# Patient Record
Sex: MALE | Race: OTHER | Employment: FULL TIME | URBAN - METROPOLITAN AREA
[De-identification: names, ages, dates, MRNs, and addresses within clinical notes are randomized per-mention and may not be internally consistent; named-entity substitution may affect disease eponyms.]

---

## 2017-03-28 ENCOUNTER — HOSPITAL ENCOUNTER (EMERGENCY)
Facility: HOSPITAL | Age: 32
Discharge: HOME | End: 2017-03-29
Attending: EMERGENCY MEDICINE
Payer: COMMERCIAL

## 2017-03-28 DIAGNOSIS — J45.901 ASTHMA EXACERBATION: Primary | ICD-10-CM

## 2017-03-28 PROCEDURE — 96365 THER/PROPH/DIAG IV INF INIT: CPT

## 2017-03-28 PROCEDURE — 94644 CONT INHLJ TX 1ST HOUR: CPT

## 2017-03-28 PROCEDURE — 99285 EMERGENCY DEPT VISIT HI MDM: CPT

## 2017-03-28 RX ORDER — IPRATROPIUM BROMIDE AND ALBUTEROL SULFATE 2.5; .5 MG/3ML; MG/3ML
3 SOLUTION RESPIRATORY (INHALATION) ONCE
Status: DISCONTINUED | OUTPATIENT
Start: 2017-03-28 | End: 2017-03-28

## 2017-03-28 RX ORDER — PREDNISONE 20 MG/1
60 TABLET ORAL ONCE
Status: COMPLETED | OUTPATIENT
Start: 2017-03-28 | End: 2017-03-29

## 2017-03-28 RX ORDER — FLUTICASONE PROPIONATE AND SALMETEROL 500; 50 UG/1; UG/1
1 POWDER RESPIRATORY (INHALATION) 2 TIMES DAILY
COMMUNITY

## 2017-03-28 RX ORDER — ALBUTEROL SULFATE 90 UG/1
AEROSOL, METERED RESPIRATORY (INHALATION) EVERY 6 HOURS PRN
COMMUNITY

## 2017-03-29 VITALS
RESPIRATION RATE: 16 BRPM | HEART RATE: 97 BPM | TEMPERATURE: 98 F | WEIGHT: 200 LBS | BODY MASS INDEX: 31.39 KG/M2 | SYSTOLIC BLOOD PRESSURE: 118 MMHG | HEIGHT: 67 IN | OXYGEN SATURATION: 97 % | DIASTOLIC BLOOD PRESSURE: 66 MMHG

## 2017-03-29 RX ORDER — PREDNISONE 20 MG/1
40 TABLET ORAL DAILY
Qty: 8 TABLET | Refills: 0 | Status: SHIPPED | OUTPATIENT
Start: 2017-03-29 | End: 2017-04-02

## 2017-03-29 RX ORDER — ALBUTEROL SULFATE 90 UG/1
2 AEROSOL, METERED RESPIRATORY (INHALATION) EVERY 4 HOURS PRN
Qty: 1 INHALER | Refills: 1 | Status: SHIPPED | OUTPATIENT
Start: 2017-03-29 | End: 2017-04-28

## 2017-03-29 NOTE — ED NOTES
Patient states he is comfortable going home with prednisone and albuterol as discussed and ordered by Dr. Carol Ann Fernando. IV discontinued. Vitals stable. AVS reviewed. Patient verbalizes understanding of dc instructions. Denies any further questions/concerns.  D

## 2017-03-29 NOTE — ED NOTES
Breathing treatment continued at this time. Patient states improvement in breathing. Contacted pharmacy regarding IVPB medication they are sending now. Patient O2 100% at this time.

## 2017-03-29 NOTE — ED NOTES
IV medication complete. Patient RA saturations 97%. Patient continues to state that breathing is improved. Expiratory wheezes still noted bilaterally upon auscultation but significantly impoved in comparison to prior lung sounds.  Dr. Valeri Real at bedside a

## 2017-03-29 NOTE — ED NOTES
Magnesium Sulfate infusing at this time. Prednisone administered as ordered. Breathing treatment continued. Inspiratory wheezes improved- Expiratory wheezes remain bilaterally upon auscultation. Patient states he is feeling better.  Will continue to monitor

## 2017-03-29 NOTE — ED NOTES
Patient to ED for SOB after working out. Hx of Asthma- recently he ran out of his Advair inhaler.  He states that sometimes he feels this way after working out and it is typically resolved w/ the use of his inhaler but since it ran out he could not find rel

## 2017-04-01 NOTE — ED PROVIDER NOTES
Patient Seen in: [facility] AND [facility] Emergency Department    History   Patient presents with:  Dyspnea CRYSTAL SOB (respiratory)    Stated Complaint: Shortness of breath. Asthma exacerbation.     HPI    42-year-old male with history of asthma presents for eval 170.2 cm (5' 7\")  Wt 90.719 kg  BMI 31.32 kg/m2  SpO2 97%        Physical Exam   Constitutional: He is oriented to person, place, and time. He appears well-developed and well-nourished. He appears distressed. HENT:   Head: Normocephalic and atraumatic. (primary encounter diagnosis)    Disposition:  Discharge    Follow-up:  Carlos Norris MD  [address]  [address] [phone]            Medications Prescribed:  Discharge Medication List as of 3/29/20

## 2020-07-28 ENCOUNTER — HOSPITAL ENCOUNTER (EMERGENCY)
Facility: HOSPITAL | Age: 35
Discharge: HOME | End: 2020-07-28
Attending: EMERGENCY MEDICINE
Payer: COMMERCIAL

## 2020-07-28 VITALS
HEART RATE: 82 BPM | DIASTOLIC BLOOD PRESSURE: 82 MMHG | TEMPERATURE: 99 F | RESPIRATION RATE: 16 BRPM | HEIGHT: 66 IN | WEIGHT: 230 LBS | OXYGEN SATURATION: 98 % | SYSTOLIC BLOOD PRESSURE: 141 MMHG | BODY MASS INDEX: 36.96 KG/M2

## 2020-07-28 DIAGNOSIS — L30.9 DERMATITIS: Primary | ICD-10-CM

## 2020-07-28 PROCEDURE — 99283 EMERGENCY DEPT VISIT LOW MDM: CPT

## 2020-07-28 RX ORDER — PREDNISONE 20 MG/1
40 TABLET ORAL DAILY
Qty: 10 TABLET | Refills: 0 | Status: SHIPPED | OUTPATIENT
Start: 2020-07-28 | End: 2020-08-02

## 2020-07-28 NOTE — ED PROVIDER NOTES
Patient Seen in: [facility] AND [facility] Emergency Department    History   Patient presents with:  Rash Skin Problem    Stated Complaint: rash x3days    HPI    Patient is here with complaint of rash. He has had it for the past 2 to 3 days.   He was out by a po lesions. Skin:  Warm, dry, well perfused. Good skin turgor. Patient has rash noted more on the upper torso upper arms scattered on the back and a few small areas on the inner upper thighs bilaterally. No intraoral lesions noted.   There are areas of f

## 2021-04-04 ENCOUNTER — APPOINTMENT (OUTPATIENT)
Dept: GENERAL RADIOLOGY | Facility: HOSPITAL | Age: 36
End: 2021-04-04
Payer: COMMERCIAL

## 2021-04-04 ENCOUNTER — HOSPITAL ENCOUNTER (EMERGENCY)
Facility: HOSPITAL | Age: 36
Discharge: HOME | End: 2021-04-05
Payer: COMMERCIAL

## 2021-04-04 VITALS
RESPIRATION RATE: 18 BRPM | SYSTOLIC BLOOD PRESSURE: 138 MMHG | DIASTOLIC BLOOD PRESSURE: 65 MMHG | HEART RATE: 93 BPM | OXYGEN SATURATION: 95 % | TEMPERATURE: 98 F

## 2021-04-04 DIAGNOSIS — S67.197A CRUSHING INJURY OF LEFT LITTLE FINGER, INITIAL ENCOUNTER: Primary | ICD-10-CM

## 2021-04-04 DIAGNOSIS — S61.317A LACERATION OF LEFT LITTLE FINGER WITHOUT FOREIGN BODY WITH DAMAGE TO NAIL, INITIAL ENCOUNTER: ICD-10-CM

## 2021-04-04 DIAGNOSIS — S62.637B OPEN DISPLACED FRACTURE OF DISTAL PHALANX OF LEFT LITTLE FINGER, INITIAL ENCOUNTER: ICD-10-CM

## 2021-04-04 PROCEDURE — 96372 THER/PROPH/DIAG INJ SC/IM: CPT

## 2021-04-04 PROCEDURE — 99284 EMERGENCY DEPT VISIT MOD MDM: CPT

## 2021-04-04 PROCEDURE — 29130 APPL FINGER SPLINT STATIC: CPT

## 2021-04-04 PROCEDURE — 73140 X-RAY EXAM OF FINGER(S): CPT

## 2021-04-04 PROCEDURE — A4216 STERILE WATER/SALINE, 10 ML: HCPCS | Performed by: NURSE PRACTITIONER

## 2021-04-04 RX ORDER — HYDROCODONE BITARTRATE AND ACETAMINOPHEN 5; 325 MG/1; MG/1
1 TABLET ORAL EVERY 6 HOURS PRN
Qty: 12 TABLET | Refills: 0 | Status: SHIPPED | OUTPATIENT
Start: 2021-04-04 | End: 2021-04-07

## 2021-04-04 RX ORDER — ACETAMINOPHEN 500 MG
1000 TABLET ORAL ONCE
Status: COMPLETED | OUTPATIENT
Start: 2021-04-04 | End: 2021-04-04

## 2021-04-05 NOTE — ED QUICK NOTES
Pt's partner sts she cleaned injury w/ peroxide, which was wrapped in gauze/medical tape upon arrival. Adam Roberts by RN @ this time, bleeding controlled. PCT to irrigate. Pt sts tetanus within last 10 yrs.

## 2021-04-05 NOTE — ED PROVIDER NOTES
Patient Seen in: [facility] AND [facility] Emergency Department      History   Patient presents with:  Laceration/Abrasion    Stated Complaint: finger laceration, caught in garage.     HPI/Subjective:   HPI    77-year-old male presents to the emergency department Extraocular movements intact. Conjunctiva/sclera: Conjunctivae normal.      Pupils: Pupils are equal, round, and reactive to light. Cardiovascular:      Rate and Rhythm: Normal rate. Pulses: Normal pulses.    Pulmonary:      Effort: Pulmonary ef encounter  Laceration of left little finger without foreign body with damage to nail, initial encounter    Disposition:  Discharge  4/4/2021 11:56 pm    Follow-up:  Governor MD Yuli  [address]  [phone]

## (undated) NOTE — ED AVS SNAPSHOT
[facility] Emergency Department    [address]     [place] [address]    Phone:  [phone]    Fax:  [phone]           Kwabena Ludin   MRN: [id number]    Department:  [facility] Emergency Department   Date of Visit: What changed: This medication is already on your medication list.  Do NOT take both doses of the new and old medication. ONLY take the dose prescribed today.             Where to Get Your Medications      You can get these medications from any pharmacy a substitute for ongoing medical care. Often, one Emergency Department visit does not uncover every injury or illness.  If you have been referred to a primary care or a specialist physician for a follow-up visit, please tell this physician (or your personal [place] ([address]) [address]) [phone]   [place] [address] [employer]. ([address]) [address] [facility] [employer].  ([address] [facility] If you have questions, you can call [phone] to talk to our [facility] Staff. Remember, [employer] is NOT to be used for urgent needs. For medical emergencies, dial 911.

## (undated) NOTE — ED AVS SNAPSHOT
[facility] Emergency Department    [address]     [place] [address]    Phone:  [phone]    Fax:  [phone]           Roberta Yoon   MRN: [id number]    Department:  [facility] Emergency Department   Date of Visit: and Class Registration line at [phone] or find a doctor online by visiting [email or web address].    IF THERE IS ANY CHANGE OR WORSENING OF YOUR CONDITION, CALL YOUR PRIMARY CARE PHYSICIAN AT ONCE OR RETURN IMMEDIATELY TO [address] [place] [address].     If

## (undated) NOTE — LETTER
Date & Time: 4/4/2021, 11:54 PM  Patient: Suzan Newman  Encounter Provider(s):    RUDDY Howell       To Whom It May Concern:    Suzan Newman was seen and treated in our department on 4/4/2021.  He can return to work until [facility]